# Patient Record
Sex: MALE | Race: WHITE | URBAN - METROPOLITAN AREA
[De-identification: names, ages, dates, MRNs, and addresses within clinical notes are randomized per-mention and may not be internally consistent; named-entity substitution may affect disease eponyms.]

---

## 2017-01-18 DIAGNOSIS — R53.82 CHRONIC FATIGUE: Primary | ICD-10-CM

## 2017-01-18 RX ORDER — BUPROPION HYDROCHLORIDE 150 MG/1
150 TABLET ORAL EVERY MORNING
Qty: 90 TABLET | Refills: 3 | Status: SHIPPED | OUTPATIENT
Start: 2017-01-18 | End: 2018-06-28

## 2018-06-28 ENCOUNTER — OFFICE VISIT (OUTPATIENT)
Dept: FAMILY MEDICINE | Facility: CLINIC | Age: 43
End: 2018-06-28

## 2018-06-28 VITALS
SYSTOLIC BLOOD PRESSURE: 124 MMHG | TEMPERATURE: 98.1 F | HEART RATE: 72 BPM | RESPIRATION RATE: 16 BRPM | OXYGEN SATURATION: 98 % | DIASTOLIC BLOOD PRESSURE: 76 MMHG | BODY MASS INDEX: 28.84 KG/M2 | WEIGHT: 202.4 LBS

## 2018-06-28 DIAGNOSIS — R42 VERTIGO: ICD-10-CM

## 2018-06-28 DIAGNOSIS — G47.9 SLEEP DISORDER: ICD-10-CM

## 2018-06-28 DIAGNOSIS — K59.09 CHRONIC CONSTIPATION: ICD-10-CM

## 2018-06-28 DIAGNOSIS — Z52.000 WHOLE BLOOD DONOR: ICD-10-CM

## 2018-06-28 DIAGNOSIS — E55.9 VITAMIN D DEFICIENCY: ICD-10-CM

## 2018-06-28 DIAGNOSIS — K64.9 HEMORRHOIDS, UNSPECIFIED HEMORRHOID TYPE: ICD-10-CM

## 2018-06-28 DIAGNOSIS — Z13.1 SCREENING FOR DIABETES MELLITUS: ICD-10-CM

## 2018-06-28 DIAGNOSIS — E78.00 ELEVATED LDL CHOLESTEROL LEVEL: ICD-10-CM

## 2018-06-28 DIAGNOSIS — Z00.00 ENCOUNTER FOR ROUTINE ADULT HEALTH EXAMINATION WITHOUT ABNORMAL FINDINGS: Primary | ICD-10-CM

## 2018-06-28 LAB
% GRANULOCYTES: 58.1 % (ref 42.2–75.2)
HCT VFR BLD AUTO: 41.8 % (ref 39–51)
HEMOGLOBIN: 14 G/DL (ref 13.4–17.5)
LYMPHOCYTES NFR BLD AUTO: 33.7 % (ref 20.5–51.1)
MCH RBC QN AUTO: 26.6 PG (ref 27–31)
MCHC RBC AUTO-ENTMCNC: 33.7 G/DL (ref 33–37)
MCV RBC AUTO: 79 FL (ref 80–100)
MONOCYTES NFR BLD AUTO: 8.2 % (ref 1.7–9.3)
PLATELET # BLD AUTO: 170 K/UL (ref 140–450)
RBC # BLD AUTO: 5.28 X10/CMM (ref 4.2–5.9)
WBC # BLD AUTO: 6.1 X10/CMM (ref 3.8–11)

## 2018-06-28 PROCEDURE — 99396 PREV VISIT EST AGE 40-64: CPT | Performed by: FAMILY MEDICINE

## 2018-06-28 PROCEDURE — 36415 COLL VENOUS BLD VENIPUNCTURE: CPT | Performed by: FAMILY MEDICINE

## 2018-06-28 PROCEDURE — 85025 COMPLETE CBC W/AUTO DIFF WBC: CPT | Performed by: FAMILY MEDICINE

## 2018-06-28 ASSESSMENT — PATIENT HEALTH QUESTIONNAIRE - PHQ9: 5. POOR APPETITE OR OVEREATING: SEVERAL DAYS

## 2018-06-28 ASSESSMENT — ANXIETY QUESTIONNAIRES
5. BEING SO RESTLESS THAT IT IS HARD TO SIT STILL: NOT AT ALL
2. NOT BEING ABLE TO STOP OR CONTROL WORRYING: NEARLY EVERY DAY
1. FEELING NERVOUS, ANXIOUS, OR ON EDGE: MORE THAN HALF THE DAYS
3. WORRYING TOO MUCH ABOUT DIFFERENT THINGS: MORE THAN HALF THE DAYS
IF YOU CHECKED OFF ANY PROBLEMS ON THIS QUESTIONNAIRE, HOW DIFFICULT HAVE THESE PROBLEMS MADE IT FOR YOU TO DO YOUR WORK, TAKE CARE OF THINGS AT HOME, OR GET ALONG WITH OTHER PEOPLE: SOMEWHAT DIFFICULT
6. BECOMING EASILY ANNOYED OR IRRITABLE: MORE THAN HALF THE DAYS
GAD7 TOTAL SCORE: 10
7. FEELING AFRAID AS IF SOMETHING AWFUL MIGHT HAPPEN: NOT AT ALL

## 2018-06-28 NOTE — PROGRESS NOTES
SUBJECTIVE:   CC: Serjio Grant is an 43 year old male who presents for preventative health visit.     Pt is well known to [facility]; prior PCP Dr. Antonio Lafleur retired 4/2018. Pt is new to me today. He has been attending this clinic since birth. Currently lives in [place] and plans to continue to receive his care here.     Healthy Habits:    Do you get at least three servings of calcium containing foods daily (dairy, green leafy vegetables, etc.)? yes    Amount of exercise or daily activities, outside of work: 5 day(s) per week - walk in the morning     Problems taking medications regularly not applicable    Medication side effects: No    Have you had an eye exam in the past two years? yes    Do you see a dentist twice per year? yes    Do you have sleep apnea, excessive snoring or daytime drowsiness? Daytime drowsiness     - Was turned down for blood donation x 2 for Hep B. He is not sure why. His repeat tests were negative when they were retested at the blood bank. He would like to know if he has Hep B or not, or what are his risks of developing Hep B in the future.   - Skin spots on L hand and L forearm. Not painful. Would like to have them assessed. Small spot on his hand is stable. Spot on L forearm is at the site of a prior cryo tx many yrs ago. May be growing a little.   - Gets to sleep easily but wakes up during the night and can't turn his mind off to get back to sleep - woke up at 4 am today. Has been up since then. His wife states he snores and perhaps has apneic events. He has daytime sleepiness; likes to take a nap at noontime. Has not had a sleep study, but suspects he may have QUINTEN. Is willing to consider having a sleep study.   - Has long hx of hemorrhoids. Dr. Lafleur had given him referral for tx, but pt states he became busy and did not follow-up. Hemorrhoids are flaring again now. No bleeding, but has a personal and family hx of chronic constipation.   - Occas will get a little  dizzy when he turns his head. Last for 5 seconds. Does not happen consistently. Goes away on its own. Does not happen when he turns over in bed. No n/v/vision changes, HA or other assoc symptoms.   - No other concerns. Feels well.     Today's PHQ-2 Score:   PHQ-2 ( 1999 Pfizer) 6/28/2018 11/29/2016   Q1: Little interest or pleasure in doing things 1 1   Q2: Feeling down, depressed or hopeless 1 0   PHQ-2 Score 2 1     Abuse: Current or Past(Physical, Sexual or Emotional) - No  Do you feel safe in your environment - Yes    Social History   Substance Use Topics     Smoking status: Never Smoker     Smokeless tobacco: Never Used     Alcohol use 0.0 oz/week     0 Standard drinks or equivalent per week      Comment: occasional      If you drink alcohol do you typically have >3 drinks per day or >7 drinks per week? No                     Reviewed orders with patient. Reviewed health maintenance and updated orders accordingly - Yes  BP Readings from Last 3 Encounters:   06/28/18 124/76   11/29/16 124/76   04/15/15 122/84    Wt Readings from Last 3 Encounters:   06/28/18 91.8 kg (202 lb 6.4 oz)   11/29/16 90.7 kg (200 lb)   04/15/15 88 kg (194 lb)          Patient Active Problem List   Diagnosis     Blood donor, whole blood     Poor sleep     Chronic constipation     Hemorrhoids, unspecified hemorrhoid type     Past Surgical History:   Procedure Laterality Date     VASECTOMY         Social History   Substance Use Topics     Smoking status: Never Smoker     Smokeless tobacco: Never Used     Alcohol use 0.0 oz/week     0 Standard drinks or equivalent per week      Comment: occasional     Family History   Problem Relation Age of Onset     Endocrine Disease Mother 40     b12 def     Depression Mother      Bladder Cancer Father      Eye Disorder Father      Mac deg + glaucoma     Hyperlipidemia Brother      Diabetes Brother          No current outpatient prescriptions on file.     No Known Allergies    Reviewed and updated as  needed this visit by clinical staff  Tobacco  Allergies  Meds  Med Hx  Surg Hx  Fam Hx  Soc Hx        Reviewed and updated as needed this visit by Provider  Med Hx  Surg Hx  Fam Hx        Past Medical History:   Diagnosis Date     NO ACTIVE PROBLEMS       Past Surgical History:   Procedure Laterality Date     VASECTOMY         ROS:  CONSTITUTIONAL: NEGATIVE for fever, chills, change in weight  INTEGUMENTARY/SKIN: NEGATIVE for worrisome rashes, moles or lesions with exceptions noted above.   EYES: NEGATIVE for vision changes or irritation  ENT: NEGATIVE for ear, mouth and throat problems  RESP: NEGATIVE for significant cough or SOB  CV: NEGATIVE for chest pain, palpitations or peripheral edema  GI: NEGATIVE for nausea, abdominal pain, heartburn, or change in bowel habits   male: negative for dysuria, hematuria, decreased urinary stream, erectile dysfunction, urethral discharge  MUSCULOSKELETAL: NEGATIVE for significant arthralgias or myalgia  NEURO: NEGATIVE for weakness, dizziness or paresthesias  PSYCHIATRIC: NEGATIVE for changes in mood or affect  See my comments above for additional pertinent positives.   OBJECTIVE:   /76  Pulse 72  Temp 98.1  F (36.7  C) (Oral)  Resp 16  Wt 91.8 kg (202 lb 6.4 oz)  SpO2 98%  BMI 28.84 kg/m2  EXAM:  GENERAL: healthy, alert and no distress. Neatly dressed; well groomed. Appears more fit than his BMI may suggest.   EYES: Eyes grossly normal to inspection, PERRL and conjunctivae and sclerae normal  HENT: ear canals and TM's normal, nose and mouth without ulcers or lesions  NECK: no adenopathy, no asymmetry, masses, or scars and thyroid normal to palpation  RESP: lungs clear to auscultation - no rales, rhonchi or wheezes  CV: regular rate and rhythm, normal S1 S2, no S3 or S4, no murmur, click or rub, no peripheral edema and peripheral pulses strong  ABDOMEN: soft, nontender, no hepatosplenomegaly, no masses and bowel sounds normal   (male): normal male  genitalia without lesions or urethral discharge, no hernia. Prostate exam deferred. Moderate volume of grade IV hemorrhoids - no bleeding.   MS: no gross musculoskeletal defects noted, no edema  SKIN: no suspicious lesions or rashes; N o jaundice. Has a 2 mm hypopigmented domed lesion with well circumscribed borders and without additional blood vessels on dorsum of L hand - appears benign. Also has a 4 mm domed flesh colored lesion on L forearm appears consistent w/ a dermatofibroma.   NEURO: Normal strength and tone, mentation intact and speech normal  PSYCH: mentation appears normal, affect normal/bright    ASSESSMENT/PLAN:   Serjio was seen today for physical.    Diagnoses and all orders for this visit:    Encounter for routine adult health examination without abnormal findings  -     HBsAg Screen ([facility])    Elevated LDL cholesterol level  -     Lipid Panel ([facility])  -     Comp. Metabolic Panel (14) ([facility])    Whole blood donor  -     Hepatitis Panel (4) ([facility])  -     Cancel: HEP B SURFACE ANTIBODY IMMUNE STATUS QUANTA  -     CBC with Diff/Plt ([facility])  -     Comp. Metabolic Panel (14) ([facility])    Screening for diabetes mellitus  -     Comp. Metabolic Panel (14) ([facility])    Vitamin D deficiency  -     Vitamin D  25-Hydroxy ([facility])    Sleep disorder  -     SLEEP EVALUATION & MANAGEMENT REFERRAL - ADULT -; Future    Hemorrhoids, unspecified hemorrhoid type   He is not sure he wants to follow-up on these now. Wrote out instructions on AVS to call any time if he would like a referral to colorectal surgery for eval and tx. OK to use OTCs and sitz baths in the interim and work on keeping stools soft.     Chronic constipation   Long-standing problem. Tries to stay well hydrated. Walks every morning before work which helps. Discussed OTCs. He will try options at home.     Vertigo   Symptoms have been present for a years, sound benign and are not progressing. Discussed resources for him for possible BPPV. We did  "not have time to investigate further than a brief discussion today. OK for referral to vestib rehab at any time for eval and tx if becomes more problematic. Encouraged him to make sure he continues to drink enough water daily.     COUNSELING:  Reviewed preventive health counseling, as reflected in patient instructions       Regular exercise       Healthy diet/nutrition       Immunizations - may want to get Hep series once we know his status. He is low risk for having Hep.     BP Readings from Last 1 Encounters:   06/28/18 124/76     Estimated body mass index is 28.84 kg/(m^2) as calculated from the following:    Height as of 11/29/16: 1.784 m (5' 10.25\").    Weight as of this encounter: 91.8 kg (202 lb 6.4 oz).    BP Screening:   Last 3 BP Readings:    BP Readings from Last 3 Encounters:   06/28/18 124/76   11/29/16 124/76   04/15/15 122/84     The following was recommended to the patient:  Re-screen BP within a year and recommended lifestyle modifications  Weight management plan: Discussed healthy diet and exercise guidelines and patient will follow up in 12 months in clinic to re-evaluate.     reports that he has never smoked. He has never used smokeless tobacco.    Addendum:  PHQ-9 SCORE 6/28/2018   Total Score 12   Denies any thoughts he would be better off dead or consideration of hurting himself.     EDWAR-7 SCORE 6/28/2018   Total Score 10     Counseling Resources:  ATP IV Guidelines  Pooled Cohorts Equation Calculator  FRAX Risk Assessment  ICSI Preventive Guidelines  Dietary Guidelines for Americans, 2010  USDA's MyPlate  ASA Prophylaxis  Lung CA Screening    Anum De Leon MD  [facility]    Health Maintenance   Topic Date Due     HIV SCREEN (SYSTEM ASSIGNED)  05/20/1992     INFLUENZA VACCINE (Season Ended) 09/01/2018     PHQ-2 Q1 YR  06/28/2019     LIPID SCREEN Q5 YR MALE (SYSTEM ASSIGNED)  04/15/2020     TETANUS IMMUNIZATION (SYSTEM ASSIGNED)  04/28/2020       "

## 2018-06-28 NOTE — PATIENT INSTRUCTIONS
Preventive Health Recommendations  Male Ages 40 to 49    Yearly exam:             See your health care provider every year in order to  o   Review health changes.   o   Discuss preventive care.    o   Review your medicines if your doctor has prescribed any.    You should be tested each year for STDs (sexually transmitted diseases) if you re at risk.     Have a cholesterol test every 5 years.     Have a colonoscopy (test for colon cancer) if someone in your family has had colon cancer or polyps before age 50.     After age 45, have a diabetes test (fasting glucose). If you are at risk for diabetes, you should have this test every 3 years.      Talk with your health care provider about whether or not a prostate cancer screening test (PSA) is right for you.    Shots: Get a flu shot each year. Get a tetanus shot every 10 years.     Nutrition:    Eat at least 5 servings of fruits and vegetables daily.     Eat whole-grain bread, whole-wheat pasta and brown rice instead of white grains and rice.     Get adequate Calcium and Vitamin D.     Lifestyle    Exercise for at least 150 minutes a week (30 minutes a day, 5 days a week). This will help you control your weight and prevent disease.     Limit alcohol to one drink per day.     No smoking.     Wear sunscreen to prevent skin cancer.     See your dentist every six months for an exam and cleaning.      Health Maintenance   Topic Date Due     HIV SCREEN (SYSTEM ASSIGNED)  05/20/1992     PHQ-2 Q1 YR  11/29/2017     INFLUENZA VACCINE (Season Ended) 09/01/2018     LIPID SCREEN Q5 YR MALE (SYSTEM ASSIGNED)  04/15/2020     TETANUS IMMUNIZATION (SYSTEM ASSIGNED)  04/28/2020     OK to use OTC hemorrhoid cream or suppositories. Sitz baths in warm water after a bowel movement can help with the pain.   OK to use a small volume (1tsp) of Miralax several times a week to keep stools moving. Titrate to effect.   Just call if you want a referral to a colorectal surgeon.     We'll watch the  spot on your left hand and left forearm.     A common form of vertigo is benign positional vertigo.

## 2018-06-28 NOTE — MR AVS SNAPSHOT
After Visit Summary   6/28/2018    Serjio Grant    MRN: [id number]           Patient Information     Date Of Birth          1975        Visit Information        Provider Department      6/28/2018 7:45 AM Anum De Leon MD [facility]        Today's Diagnoses     Encounter for routine adult health examination without abnormal findings    -  1    Screening for diabetes mellitus        Elevated LDL cholesterol level        Vitamin D deficiency        Whole blood donor        Sleep disorder          Care Instructions      Preventive Health Recommendations  Male Ages 40 to 49    Yearly exam:             See your health care provider every year in order to  o   Review health changes.   o   Discuss preventive care.    o   Review your medicines if your doctor has prescribed any.    You should be tested each year for STDs (sexually transmitted diseases) if you re at risk.     Have a cholesterol test every 5 years.     Have a colonoscopy (test for colon cancer) if someone in your family has had colon cancer or polyps before age 50.     After age 45, have a diabetes test (fasting glucose). If you are at risk for diabetes, you should have this test every 3 years.      Talk with your health care provider about whether or not a prostate cancer screening test (PSA) is right for you.    Shots: Get a flu shot each year. Get a tetanus shot every 10 years.     Nutrition:    Eat at least 5 servings of fruits and vegetables daily.     Eat whole-grain bread, whole-wheat pasta and brown rice instead of white grains and rice.     Get adequate Calcium and Vitamin D.     Lifestyle    Exercise for at least 150 minutes a week (30 minutes a day, 5 days a week). This will help you control your weight and prevent disease.     Limit alcohol to one drink per day.     No smoking.     Wear sunscreen to prevent skin cancer.     See your dentist every six months for an exam and cleaning.      Health Maintenance    Topic Date Due     HIV SCREEN (SYSTEM ASSIGNED)  05/20/1992     PHQ-2 Q1 YR  11/29/2017     INFLUENZA VACCINE (Season Ended) 09/01/2018     LIPID SCREEN Q5 YR MALE (SYSTEM ASSIGNED)  04/15/2020     TETANUS IMMUNIZATION (SYSTEM ASSIGNED)  04/28/2020     OK to use OTC hemorrhoid cream or suppositories. Sitz baths in warm water after a bowel movement can help with the pain.   OK to use a small volume (1tsp) of Miralax several times a week to keep stools moving. Titrate to effect.   Just call if you want a referral to a colorectal surgeon.     We'll watch the spot on your left hand and left forearm.     A common form of vertigo is benign positional vertigo.           Follow-ups after your visit        Additional Services     SLEEP EVALUATION & MANAGEMENT REFERRAL - ADULT -       Please eval and tx suspected sleep apnea - falls asleep easily, wakes easily and can't get back to sleep. Impacts mood.   Needs noontime nap. Daytime sleepiness. Poor daytime concentration. No etoh before bedtime.                  Future tests that were ordered for you today     Open Future Orders        Priority Expected Expires Ordered    SLEEP EVALUATION & MANAGEMENT REFERRAL - ADULT - Routine  6/28/2019 6/28/2018            Who to contact     If you have questions or need follow up information about today's clinic visit or your schedule please contact [facility] directly at [phone].  Normal or non-critical lab and imaging results will be communicated to you by MyChart, letter or phone within 4 business days after the clinic has received the results. If you do not hear from us within 7 days, please contact the clinic through MyChart or phone. If you have a critical or abnormal lab result, we will notify you by phone as soon as possible.  Submit refill requests through [facility] or call your pharmacy and they will forward the refill request to us. Please allow 3 business days for your refill to be completed.           Additional Information About Your Visit        [employer] Information     [facility] gives you secure access to your electronic health record. If you see a primary care provider, you can also send messages to your care team and make appointments. If you have questions, please call your primary care clinic.  If you do not have a primary care provider, please call [phone] and they will assist you.        Care EveryWhere ID     This is your Care EveryWhere ID. This could be used by other organizations to access your [place] medical records  [id number]        Your Vitals Were     Pulse Temperature Respirations Pulse Oximetry BMI (Body Mass Index)       72 98.1  F (36.7  C) (Oral) 16 98% 28.84 kg/m2        Blood Pressure from Last 3 Encounters:   06/28/18 124/76   11/29/16 124/76   04/15/15 122/84    Weight from Last 3 Encounters:   06/28/18 91.8 kg (202 lb 6.4 oz)   11/29/16 90.7 kg (200 lb)   04/15/15 88 kg (194 lb)              We Performed the Following     CBC with Diff/Plt ([facility])     Comp. Metabolic Panel (14) ([facility])     HEP B SURFACE ANTIBODY IMMUNE STATUS QUANTA     Hepatitis Panel (4) ([facility])     Lipid Panel ([facility])     Vitamin D  25-Hydroxy ([facility])          Today's Medication Changes          These changes are accurate as of 6/28/18  8:26 AM.  If you have any questions, ask your nurse or doctor.               Stop taking these medicines if you haven't already. Please contact your care team if you have questions.     buPROPion 150 MG 24 hr tablet   Commonly known as:  WELLBUTRIN XL   Stopped by:  Anum De Leon MD           terbinafine 250 MG tablet   Commonly known as:  lamISIL   Stopped by:  Anum De Leon MD                    Primary Care Provider Office Phone # Fax #    Anum De Leon [phone] [id number] [facility] [address]        Equal Access to Services     JAY PERSAUD AH: nitesh David, aurelia  matty sanchez marcelo salinas ah. So [facility] [phone].    ATENCIÓN: Si habla jessica, tiene a hare disposición servicios gratuitos de asistencia lingüística. Llame al [phone].    We comply with applicable federal civil rights laws and [place] laws. We do not discriminate on the basis of race, color, national origin, age, disability, sex, sexual orientation, or gender identity.            Thank you!     Thank you for choosing [facility]  for your care. Our goal is always to provide you with excellent care. Hearing back from our patients is one way we can continue to improve our services. Please take a few minutes to complete the written survey that you may receive in the mail after your visit with us. Thank you!             Your Updated Medication List - Protect others around you: Learn how to safely use, store and throw away your medicines at www.disposemymeds.org.      Notice  As of 6/28/2018  8:26 AM    You have not been prescribed any medications.

## 2018-06-29 PROBLEM — K64.9 HEMORRHOIDS, UNSPECIFIED HEMORRHOID TYPE: Status: ACTIVE | Noted: 2018-06-29

## 2018-06-29 PROBLEM — Z52.000 BLOOD DONOR, WHOLE BLOOD: Status: ACTIVE | Noted: 2018-06-29

## 2018-06-29 PROBLEM — K59.09 CHRONIC CONSTIPATION: Status: ACTIVE | Noted: 2018-06-29

## 2018-06-29 PROBLEM — Z72.820 POOR SLEEP: Status: ACTIVE | Noted: 2018-06-29

## 2018-06-29 LAB
ALBUMIN SERPL-MCNC: 4.9 G/DL (ref 3.5–5.5)
ALBUMIN/GLOB SERPL: 1.9 {RATIO} (ref 1.2–2.2)
ALP SERPL-CCNC: 76 IU/L (ref 39–117)
ALT SERPL-CCNC: 24 IU/L (ref 0–44)
AST SERPL-CCNC: 19 IU/L (ref 0–40)
BILIRUB SERPL-MCNC: 0.4 MG/DL (ref 0–1.2)
BUN SERPL-MCNC: 11 MG/DL (ref 6–24)
BUN/CREATININE RATIO: 11 (ref 9–20)
CALCIUM SERPL-MCNC: 9.6 MG/DL (ref 8.7–10.2)
CHLORIDE SERPLBLD-SCNC: 100 MMOL/L (ref 96–106)
CHOLEST SERPL-MCNC: 196 MG/DL (ref 100–199)
CREAT SERPL-MCNC: 1.02 MG/DL (ref 0.76–1.27)
EGFR IF AFRICN AM: 103 ML/MIN/1.73
EGFR IF NONAFRICN AM: 89 ML/MIN/1.73
GLOBULIN, TOTAL: 2.6 G/DL (ref 1.5–4.5)
GLUCOSE SERPL-MCNC: 104 MG/DL (ref 65–99)
HAV IGM SER QL: NEGATIVE
HBSAG SCREEN: NEGATIVE
HCV AB SERPL QL IA: <0.1 S/CO RATIO (ref 0–0.9)
HDLC SERPL-MCNC: 46 MG/DL
HEP B CORE AB, IGM: NEGATIVE
LDL/HDL RATIO: 2.8 RATIO (ref 0–3.6)
LDLC SERPL CALC-MCNC: 131 MG/DL (ref 0–99)
POTASSIUM SERPL-SCNC: 4.1 MMOL/L (ref 3.5–5.2)
PROT SERPL-MCNC: 7.5 G/DL (ref 6–8.5)
SODIUM SERPL-SCNC: 138 MMOL/L (ref 134–144)
TOTAL CO2: 25 MMOL/L (ref 20–29)
TRIGL SERPL-MCNC: 97 MG/DL (ref 0–149)
VITAMIN D, 25-HYDROXY: 29.2 NG/ML (ref 30–100)
VLDLC SERPL CALC-MCNC: 19 MG/DL (ref 5–40)

## 2018-06-29 ASSESSMENT — PATIENT HEALTH QUESTIONNAIRE - PHQ9: SUM OF ALL RESPONSES TO PHQ QUESTIONS 1-9: 12

## 2018-06-29 ASSESSMENT — ANXIETY QUESTIONNAIRES: GAD7 TOTAL SCORE: 10

## 2018-07-05 NOTE — PROGRESS NOTES
Order(s) created erroneously. Erroneous order ID: [id number]   Order canceled by: EDDIE LOVE   Order cancel date/time: 07/05/2018 10:25 AM

## 2019-09-29 ENCOUNTER — HEALTH MAINTENANCE LETTER (OUTPATIENT)
Age: 44
End: 2019-09-29

## 2021-01-14 ENCOUNTER — HEALTH MAINTENANCE LETTER (OUTPATIENT)
Age: 46
End: 2021-01-14

## 2021-10-24 ENCOUNTER — HEALTH MAINTENANCE LETTER (OUTPATIENT)
Age: 46
End: 2021-10-24

## 2022-02-13 ENCOUNTER — HEALTH MAINTENANCE LETTER (OUTPATIENT)
Age: 47
End: 2022-02-13

## 2022-10-15 ENCOUNTER — HEALTH MAINTENANCE LETTER (OUTPATIENT)
Age: 47
End: 2022-10-15

## 2023-03-26 ENCOUNTER — HEALTH MAINTENANCE LETTER (OUTPATIENT)
Age: 48
End: 2023-03-26